# Patient Record
Sex: FEMALE | Race: WHITE | NOT HISPANIC OR LATINO | URBAN - METROPOLITAN AREA
[De-identification: names, ages, dates, MRNs, and addresses within clinical notes are randomized per-mention and may not be internally consistent; named-entity substitution may affect disease eponyms.]

---

## 2020-11-06 ENCOUNTER — OFFICE (OUTPATIENT)
Dept: URBAN - METROPOLITAN AREA TELEHEALTH 3 | Facility: TELEHEALTH | Age: 35
End: 2020-11-06
Payer: COMMERCIAL

## 2020-11-06 VITALS — WEIGHT: 170 LBS | HEIGHT: 64 IN

## 2020-11-06 DIAGNOSIS — K85.00 IDIOPATHIC ACUTE PANCREATITIS WITHOUT NECROSIS OR INFECTION: ICD-10-CM

## 2020-11-06 DIAGNOSIS — E11.9 TYPE 2 DIABETES MELLITUS WITHOUT COMPLICATIONS: ICD-10-CM

## 2020-11-06 PROCEDURE — 99205 OFFICE O/P NEW HI 60 MIN: CPT | Mod: 95 | Performed by: INTERNAL MEDICINE

## 2020-11-06 NOTE — SERVICEHPINOTES
PATIENT VERIFIED BY DATE OF BIRTH AND NAME. Patient has been consented for this telecommunication visit.   LIBRADO MEIER   is a   35   year old    female who is being seen in consultation at the request of   GONZALEZ DENISE   for pancreatitis, hospitalized in May at [facility].  Toward the end of August she was hospitalized with DKA at [facility].  The diabetes was a new problem.  She was then transferred to [facility] after having a seizure.  She also had BUCK and required temporary dialysis.   In the interim time period she did see a GI in [place] virtually once.  She started having n/v a week or two prior to presenting with DKA.  She now wants to know if her pancreas is OK.  She says they attributed the pancreatitis to EtOH.  She drinks about 3-4 glasses of wine a week and no liquor.  She didn't have gallstones.  Prior to the pancreatitis episode she was on Wellbutrin, Celexa, clonapin for anxiety, levothyroxine, OCP and Addarall.  All the other meds are since either first or second hospitalization.  She had any alcohol since.  No family hx of pancreatitis. Sylwia feels fine now.  She is on pantoprazole and this has helped problems with heartburn.  And she hasn't had pains in her abdomen since May.  She sees an endocrinologist for her sugar control.  Her A1C in [place] was normal.  But then in August it was 11.  Her pancreatitis treatment course was uncomplicated.  I don't have records of her hospitalization but based on what Ms. Meier is telling me.  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  She hasn't had any imaging after the pancreatitis episode.  She recalls her triglycerides were 108.  Sylwia has normal BMs.  No oily droplets in stool or toilet or diarrhea.  She has lost 40 lbs from end of May to August.  Since August she has lost another 10 lbs.  In the last few weeks she's been stable.  No problems eating. She is on a diabetic diet. CHEMA

## 2020-11-24 LAB
HEMOGLOBIN A1C: 6.9 % OF TOTAL HGB — HIGH (ref ?–5.7)
TRIGLYCERIDES: 69 MG/DL (ref ?–150)